# Patient Record
Sex: FEMALE | Race: WHITE | Employment: STUDENT | URBAN - METROPOLITAN AREA
[De-identification: names, ages, dates, MRNs, and addresses within clinical notes are randomized per-mention and may not be internally consistent; named-entity substitution may affect disease eponyms.]

---

## 2023-02-25 ENCOUNTER — HOSPITAL ENCOUNTER (EMERGENCY)
Age: 23
Discharge: HOME | End: 2023-02-26
Payer: OTHER GOVERNMENT

## 2023-02-25 VITALS
HEART RATE: 82 BPM | RESPIRATION RATE: 15 BRPM | OXYGEN SATURATION: 100 % | HEIGHT: 70 IN | TEMPERATURE: 98.7 F | SYSTOLIC BLOOD PRESSURE: 170 MMHG | WEIGHT: 200 LBS | DIASTOLIC BLOOD PRESSURE: 93 MMHG | BODY MASS INDEX: 28.63 KG/M2

## 2023-02-25 DIAGNOSIS — S06.0X0A CONCUSSION WITHOUT LOSS OF CONSCIOUSNESS, INITIAL ENCOUNTER: ICD-10-CM

## 2023-02-25 DIAGNOSIS — S09.90XA CLOSED HEAD INJURY, INITIAL ENCOUNTER: Primary | ICD-10-CM

## 2023-02-25 PROCEDURE — 99284 EMERGENCY DEPT VISIT MOD MDM: CPT

## 2023-02-26 ENCOUNTER — APPOINTMENT (OUTPATIENT)
Dept: CT IMAGING | Age: 23
End: 2023-02-26
Payer: OTHER GOVERNMENT

## 2023-02-26 PROCEDURE — 70450 CT HEAD/BRAIN W/O DYE: CPT

## 2023-02-26 PROCEDURE — 72125 CT NECK SPINE W/O DYE: CPT

## 2023-02-26 NOTE — DISCHARGE INSTRUCTIONS
Perform cognitive rest for the next 1 week, avoid excess cell phone, computer and screen time. Please follow-up with your primary care doctor take Motrin or Tylenol for additional pain relief.

## 2023-02-26 NOTE — ED PROVIDER NOTES
independent  HPI:  2/25/23, Time: 11:57 PM YARELIS South is a 25 y.o. female presenting to the ED for head injury. Patient presents to the emergency department with concerns for head injury after just prior to arrival some books that were high up on a shelf fell directly on top of her head. Patient denies loss of consciousness with this she is not on any anticoagulation therapy but does report previous brain bleed from striking her head on a desk. Patient states that that was 2 years ago and was very concerned about what occurred tonight. She denies taking anything for pain relief and denies needing anything here for additional pain relief. She does not have any obvious area of contusion, hematoma, laceration. She denies any headaches or any lightheadedness or dizziness. She also denies any unusual nausea or vomiting since the box struck her head. Patient also without any unusual paresthesia to her upper or lower extremities as well as no associated neck pain. Patient with symptoms moderate in severity and persistent  Review of Systems:   A complete review of systems was performed and pertinent positives and negatives are stated within HPI, all other systems reviewed and are negative.          --------------------------------------------- PAST HISTORY ---------------------------------------------  Past Medical History:  has a past medical history of Brain bleed ([facility] [address] and TBI (traumatic brain injury). Past Surgical History:  has no past surgical history on file. Social History:  reports that she has never smoked. She has never used smokeless tobacco. She reports current alcohol use of about 2.0 standard drinks per week. She reports that she does not use drugs. Family History: family history is not on file. The patients home medications have been reviewed.     Allergies: Nuts [peanut-containing drug products]    -------------------------------------------------- RESULTS -------------------------------------------------  All laboratory and radiology results have been personally reviewed by myself   LABS:  No results found for this visit on 02/25/23. RADIOLOGY:  Interpreted by Radiologist.  [address]   Final Result   No acute intracranial abnormality. CT CERVICAL SPINE WO CONTRAST   Final Result   No acute abnormality of the cervical spine.             ------------------------- NURSING NOTES AND VITALS REVIEWED ---------------------------   The nursing notes within the ED encounter and vital signs as below have been reviewed. BP (!) 170/93   Pulse 82   Temp 98.7 °F (37.1 °C) (Oral)   Resp 15   Ht 5' 10\" (1.778 m)   Wt 200 lb (90.7 kg)   LMP 02/25/2023 (Exact Date)   SpO2 100%   BMI 28.70 kg/m²   Oxygen Saturation Interpretation: Normal      ---------------------------------------------------PHYSICAL EXAM--------------------------------------      Constitutional/General: Alert and oriented x3, well appearing, non toxic in NAD  Head: Normocephalic and atraumatic  Eyes: PERRL, EOMI  Mouth: Oropharynx clear, handling secretions, no trismus  Neck: Supple, full ROM,   Pulmonary: Lungs clear to auscultation bilaterally, no wheezes, rales, or rhonchi. Not in respiratory distress  Cardiovascular:  Regular rate and rhythm, no murmurs, gallops, or rubs. 2+ distal pulses  Abdomen: Soft, non tender, non distended,   Extremities: Moves all extremities x 4. Warm and well perfused  Skin: warm and dry without rash  Neurologic: GCS 15, cranial nerves II through XII grossly intact. No acute neurovascular deficit noted. Speech clear and coherent strength strong and equal bilaterally. No point tenderness to the entire cervical thoracic or lumbar spine.   Psych: Normal Affect      ------------------------------ ED COURSE/MEDICAL DECISION MAKING----------------------  Medications - No data to display      ED COURSE:       Medical Decision Making:    Mary Kate Mina is a 25 y.o. female presenting to the ED for head injury. Patient presents to the emergency department with concerns for head injury after just prior to arrival some books that were high up on a shelf fell directly on top of her head. Patient denies loss of consciousness with this she is not on any anticoagulation therapy but does report previous brain bleed from striking her head on a desk. Patient states that that was 2 years ago and was very concerned about what occurred tonight. She denies taking anything for pain relief and denies needing anything here for additional pain relief. She does not have any obvious area of contusion, hematoma, laceration. She denies any headaches or any lightheadedness or dizziness. She also denies any unusual nausea or vomiting since the box struck her head. Patient also without any unusual paresthesia to her upper or lower extremities as well as no associated neck pain. Patient with symptoms moderate in severity and persistent. Differential diagnosis includes closed head injury versus concussion versus subarachnoid hemorrhage versus cervical strain. Plan will be to obtain CT scan of the brain as well as CT cervical spine. CT scan of the brain showed no acute intercranial abnormality, CT cervical spine also completely negative. Patient was made aware of results. She does feel markedly reassured. Patient was educated on cognitive rest for the next 5 to 7 days as well as good follow-up care with her primary care doctor. She was also educated to take Motrin or Tylenol for additional pain relief. Patient on reevaluation completely awake alert and oriented x4, lungs are clear throughout, heart rate regular rate and rhythm. Patient able to ambulate easily and independently and is laughing and talking to other female friends that came with her for support. Patient expressed understanding of good follow-up and strict return precautions. Patient discharged home.     History from : Patient and Medical records     Limitations to history : None    Chronic Conditions: has a past medical history of Brain bleed ([address] and TBI (traumatic brain injury). CONSULTS: Primary care doctor of record    Discussion with Other Profesionals : None    Social Determinants : None    Records Reviewed : Source patient and Inpatient Notes epic medical records        Disposition Considerations (Tests not ordered but considered, Shared Decision Making, Pt Expectation of Test or Tx.):   Appropriate for outpatient management yes and Evaluation by myself and discharge recommended. I am the Primary Clinician of Record. Counseling: The emergency provider has spoken with the patient and discussed todays results, in addition to providing specific details for the plan of care and counseling regarding the diagnosis and prognosis. Questions are answered at this time and they are agreeable with the plan.      --------------------------------- IMPRESSION AND DISPOSITION ---------------------------------    IMPRESSION  1. Closed head injury, initial encounter    2. Concussion without loss of consciousness, initial encounter        DISPOSITION  Disposition: Discharge to home  Patient condition is stable      NOTE: This report was transcribed using voice recognition software.  Every effort was made to ensure accuracy; however, inadvertent computerized transcription errors may be present      BEE Charles - CNP  02/27/23 0209  ATTENDING PROVIDER ATTESTATION:     Supervising Physician, on-site, available for consultation, non-participatory in the evaluation or care of this patient       Claudio Rey MD  02/27/23 [id number]

## 2023-02-26 NOTE — ED NOTES
Radiology Procedure Waiver   Name: Jesse Augustin  : 2000  MRN: [id number]    Date:  23    Time: 12:07 AM EST    Benefits of immediately proceeding with Radiology exam(s) without pre-testing outweigh the risks or are not indicated as specified below and therefore the following is/are being waived:    [x] Pregnancy test   [x] Patients LMP on-time and regular.   [] Patient had Tubal Ligation or has other Contraception Device. [] Patient  is Menopausal or Premenarcheal.    [] Patient had Full or Partial Hysterectomy. [] Protocol for Iodine allergy    [] MRI Questionnaire     [] BUN/Creatinine   [] Patient age w/no hx of renal dysfunction. [] Patient on Dialysis. [] Recent Normal Labs.   Electronically signed by BEE Zepeda CNP on 23 at 12:07 AM BEE Morales CNP  23 [id number]